# Patient Record
Sex: MALE | Race: ASIAN | NOT HISPANIC OR LATINO
[De-identification: names, ages, dates, MRNs, and addresses within clinical notes are randomized per-mention and may not be internally consistent; named-entity substitution may affect disease eponyms.]

---

## 2022-01-13 PROBLEM — Z00.00 ENCOUNTER FOR PREVENTIVE HEALTH EXAMINATION: Status: ACTIVE | Noted: 2022-01-13

## 2022-01-14 ENCOUNTER — NON-APPOINTMENT (OUTPATIENT)
Age: 62
End: 2022-01-14

## 2022-01-14 ENCOUNTER — APPOINTMENT (OUTPATIENT)
Dept: CARDIOLOGY | Facility: CLINIC | Age: 62
End: 2022-01-14
Payer: MEDICAID

## 2022-01-14 VITALS
WEIGHT: 166 LBS | TEMPERATURE: 97.9 F | RESPIRATION RATE: 18 BRPM | HEART RATE: 63 BPM | SYSTOLIC BLOOD PRESSURE: 165 MMHG | BODY MASS INDEX: 23.77 KG/M2 | OXYGEN SATURATION: 96 % | DIASTOLIC BLOOD PRESSURE: 111 MMHG | HEIGHT: 70.08 IN

## 2022-01-14 DIAGNOSIS — Z86.79 PERSONAL HISTORY OF OTHER DISEASES OF THE CIRCULATORY SYSTEM: ICD-10-CM

## 2022-01-14 DIAGNOSIS — Z80.1 FAMILY HISTORY OF MALIGNANT NEOPLASM OF TRACHEA, BRONCHUS AND LUNG: ICD-10-CM

## 2022-01-14 PROCEDURE — 93000 ELECTROCARDIOGRAM COMPLETE: CPT

## 2022-01-14 PROCEDURE — 99204 OFFICE O/P NEW MOD 45 MIN: CPT

## 2022-01-19 PROBLEM — Z86.79 HISTORY OF CARDIAC ARRHYTHMIA: Status: RESOLVED | Noted: 2022-01-19 | Resolved: 2022-01-19

## 2022-01-19 PROBLEM — Z80.1 FAMILY HISTORY OF LUNG CANCER: Status: ACTIVE | Noted: 2022-01-19

## 2022-01-19 NOTE — HISTORY OF PRESENT ILLNESS
[FreeTextEntry1] : 61 year-old male with HTN and h/o arrhythmia s/p ablation 20 years ago presents for evaluation of CP.  Patient reports that for the past 6 months he has been experiencing left upper CP at night (while sitting), described as sharp pain, not related to exertion, non-tender to touch, not worse with inspiration, lasting 3-4 minutes.  Patient reports SOB at rest.  Patient reports palpitations.  Patient denies h/o syncope.  Patient denies belching.  Patient reports that he last had CXR in 2020.  He is on Amlodipine 10 mg and Losartan 100 mg for HTN.  I advised patient to undergo an echocardiogram.  I will obtain insurance authorization (CP, possible myocarditis/pericarditis).

## 2022-01-28 ENCOUNTER — APPOINTMENT (OUTPATIENT)
Dept: CARDIOLOGY | Facility: CLINIC | Age: 62
End: 2022-01-28
Payer: MEDICAID

## 2022-01-28 VITALS
WEIGHT: 166 LBS | BODY MASS INDEX: 23.77 KG/M2 | OXYGEN SATURATION: 99 % | SYSTOLIC BLOOD PRESSURE: 150 MMHG | TEMPERATURE: 98 F | DIASTOLIC BLOOD PRESSURE: 96 MMHG | RESPIRATION RATE: 18 BRPM | HEART RATE: 82 BPM

## 2022-01-28 PROCEDURE — 93306 TTE W/DOPPLER COMPLETE: CPT

## 2022-01-28 PROCEDURE — 93015 CV STRESS TEST SUPVJ I&R: CPT

## 2022-02-24 NOTE — REASON FOR VISIT
[FreeTextEntry1] : 61 year-old male with HTN and h/o arrhythmia s/p ablation 20 years ago presents for followup.  \par \par Patient was last seen on 1/14/22 for evaluation of CP.  Patient underwent an echocardiogram and it showed normal LV function with mild aortic root dilatation (4.1 cm) and mild dilatation of the ascending aorta (4.1 cm).   Patient underwent a treadmill stress test and completed 10.5 minutes of Trevor protocol.  There were upsloping ST depressions on ECG but no symptoms.  Following treadmill stress, there was no echocardiographic evidence of ischemia.  His BP was elevated.  I advised patient to start HCTZ 12.5 mg.  Dose was increased to 25 mg two weeks later.

## 2022-02-24 NOTE — PHYSICAL EXAM
[Well Developed] : well developed [Well Nourished] : well nourished [No Acute Distress] : no acute distress [Normal Conjunctiva] : normal conjunctiva [Normal Venous Pressure] : normal venous pressure [No Carotid Bruit] : no carotid bruit [Normal S1, S2] : normal S1, S2 [No Murmur] : no murmur [No Rub] : no rub [No Gallop] : no gallop [Clear Lung Fields] : clear lung fields [Good Air Entry] : good air entry [No Respiratory Distress] : no respiratory distress  [Soft] : abdomen soft [Non Tender] : non-tender [No Masses/organomegaly] : no masses/organomegaly [Normal Bowel Sounds] : normal bowel sounds [Normal Gait] : normal gait [No Edema] : no edema [No Cyanosis] : no cyanosis [No Clubbing] : no clubbing [No Varicosities] : no varicosities [Moves all extremities] : moves all extremities [Normal Speech] : normal speech [No Focal Deficits] : no focal deficits [Alert and Oriented] : alert and oriented [Normal memory] : normal memory

## 2022-02-24 NOTE — HISTORY OF PRESENT ILLNESS
[FreeTextEntry1] : \par \par 1/14/22 - Patient reports that for the past 6 months he has been experiencing left upper CP at night (while sitting), described as sharp pain, not related to exertion, non-tender to touch, not worse with inspiration, lasting 3-4 minutes.  Patient reports SOB at rest.  Patient reports palpitations.  Patient denies h/o syncope.  Patient denies belching.  Patient reports that he last had CXR in 2020.  He is on Amlodipine 10 mg and Losartan 100 mg for HTN.  I advised patient to undergo an echocardiogram.  I will obtain insurance authorization (CP, possible myocarditis/pericarditis).

## 2022-02-25 ENCOUNTER — APPOINTMENT (OUTPATIENT)
Dept: CARDIOLOGY | Facility: CLINIC | Age: 62
End: 2022-02-25
Payer: MEDICAID

## 2022-02-25 VITALS
OXYGEN SATURATION: 97 % | SYSTOLIC BLOOD PRESSURE: 148 MMHG | DIASTOLIC BLOOD PRESSURE: 96 MMHG | WEIGHT: 164 LBS | BODY MASS INDEX: 23.48 KG/M2 | TEMPERATURE: 97.6 F | HEART RATE: 87 BPM | RESPIRATION RATE: 18 BRPM

## 2022-02-25 PROCEDURE — 99213 OFFICE O/P EST LOW 20 MIN: CPT

## 2022-03-11 LAB
ALBUMIN SERPL ELPH-MCNC: 4.6 G/DL
ALDOSTERONE SERUM: 24.8 NG/DL
ALP BLD-CCNC: 62 U/L
ALT SERPL-CCNC: 128 U/L
ANION GAP SERPL CALC-SCNC: 14 MMOL/L
AST SERPL-CCNC: 64 U/L
BILIRUB SERPL-MCNC: 0.2 MG/DL
BUN SERPL-MCNC: 17 MG/DL
CALCIUM SERPL-MCNC: 9.1 MG/DL
CHLORIDE SERPL-SCNC: 107 MMOL/L
CO2 SERPL-SCNC: 20 MMOL/L
CREAT SERPL-MCNC: 0.76 MG/DL
GLUCOSE SERPL-MCNC: 104 MG/DL
METANEPHRINE, PL: NORMAL PG/ML
NORMETANEPHRINE, PL: NORMAL PG/ML
POTASSIUM SERPL-SCNC: 4.3 MMOL/L
PROT SERPL-MCNC: 6.8 G/DL
RENIN PLASMA: 6.6 PG/ML
SODIUM SERPL-SCNC: 141 MMOL/L

## 2022-03-17 ENCOUNTER — NON-APPOINTMENT (OUTPATIENT)
Age: 62
End: 2022-03-17

## 2022-03-21 PROBLEM — R07.89 CHEST DISCOMFORT: Status: ACTIVE | Noted: 2022-01-17

## 2022-03-21 NOTE — PHYSICAL EXAM
[Well Nourished] : well nourished [Well Developed] : well developed [No Acute Distress] : no acute distress [Normal Conjunctiva] : normal conjunctiva [Normal Venous Pressure] : normal venous pressure [No Carotid Bruit] : no carotid bruit [Normal S1, S2] : normal S1, S2 [No Murmur] : no murmur [No Rub] : no rub [No Gallop] : no gallop [Clear Lung Fields] : clear lung fields [Good Air Entry] : good air entry [No Respiratory Distress] : no respiratory distress  [Soft] : abdomen soft [Non Tender] : non-tender [No Masses/organomegaly] : no masses/organomegaly [Normal Bowel Sounds] : normal bowel sounds [Normal Gait] : normal gait [No Edema] : no edema [No Cyanosis] : no cyanosis [No Clubbing] : no clubbing [No Varicosities] : no varicosities [Moves all extremities] : moves all extremities [No Focal Deficits] : no focal deficits [Normal Speech] : normal speech [Alert and Oriented] : alert and oriented [Normal memory] : normal memory

## 2022-03-25 ENCOUNTER — APPOINTMENT (OUTPATIENT)
Dept: CARDIOLOGY | Facility: CLINIC | Age: 62
End: 2022-03-25
Payer: MEDICAID

## 2022-03-25 ENCOUNTER — NON-APPOINTMENT (OUTPATIENT)
Age: 62
End: 2022-03-25

## 2022-03-25 VITALS
OXYGEN SATURATION: 97 % | BODY MASS INDEX: 23.62 KG/M2 | TEMPERATURE: 97.8 F | DIASTOLIC BLOOD PRESSURE: 76 MMHG | WEIGHT: 165 LBS | HEART RATE: 91 BPM | RESPIRATION RATE: 18 BRPM | SYSTOLIC BLOOD PRESSURE: 114 MMHG

## 2022-03-25 DIAGNOSIS — R07.89 OTHER CHEST PAIN: ICD-10-CM

## 2022-03-25 DIAGNOSIS — R00.2 PALPITATIONS: ICD-10-CM

## 2022-03-25 PROCEDURE — 99213 OFFICE O/P EST LOW 20 MIN: CPT

## 2022-03-25 PROCEDURE — 93000 ELECTROCARDIOGRAM COMPLETE: CPT

## 2022-03-25 RX ORDER — HYDROCHLOROTHIAZIDE 25 MG/1
25 TABLET ORAL DAILY
Qty: 30 | Refills: 5 | Status: DISCONTINUED | COMMUNITY
Start: 2022-01-14 | End: 2022-03-25

## 2022-03-25 RX ORDER — LOSARTAN POTASSIUM 100 MG/1
100 TABLET, FILM COATED ORAL
Refills: 0 | Status: DISCONTINUED | COMMUNITY
End: 2022-03-25

## 2022-03-25 RX ORDER — AMLODIPINE BESYLATE 10 MG/1
10 TABLET ORAL
Refills: 0 | Status: DISCONTINUED | COMMUNITY
End: 2022-03-25

## 2022-03-25 RX ORDER — ASPIRIN ENTERIC COATED TABLETS 81 MG 81 MG/1
81 TABLET, DELAYED RELEASE ORAL
Refills: 0 | Status: DISCONTINUED | COMMUNITY
End: 2022-03-25

## 2022-04-03 NOTE — HISTORY OF PRESENT ILLNESS
[FreeTextEntry1] : 3/25/22 - Patient reports that his BP is better but HR is still fast. His home BP is 120-130/ 80-94. He also reports chest tightness. He stopped taking Losartan 100 mg and also did not start on HCTZ 25 mg. He is also not taking Aspirin. I advised patient to start on Metoprolol ER 25 mg. Patient did not go for liver US. Fu in 3 months. \par \par 2/25/22 - Patient reports that his home BP is around 140-160/.  Patient reports HA when SBP around 160.  Patient reports two episodes of localized left upper CP lasting seconds.  I advised patient to have a CXR to rule out lung pathology.  His BP was elevated.  I advised patient to switch Amlodipine 10 mg to Nifedipine ER 90 mg.\par \par 1/14/22 - Patient reports that for the past 6 months he has been experiencing left upper CP at night (while sitting), described as sharp pain, not related to exertion, non-tender to touch, not worse with inspiration, lasting 3-4 minutes.  Patient reports SOB at rest.  Patient reports palpitations.  Patient denies h/o syncope.  Patient denies belching.  Patient reports that he last had CXR in 2020.  He is on Amlodipine 10 mg and Losartan 100 mg for HTN.  Patient underwent an echocardiogram and it showed normal LV function with mild aortic root dilatation (4.1 cm) and mild dilatation of the ascending aorta (4.1 cm).  Patient underwent a treadmill stress test and completed 10.5 minutes of Trevor protocol.  There were upsloping ST depressions on ECG but no symptoms.  Following treadmill stress, there was no echocardiographic evidence of ischemia.  His BP was elevated.  I advised patient to start HCTZ 12.5 mg.  Dose was increased to 25 mg two weeks later.

## 2022-04-03 NOTE — REASON FOR VISIT
[FreeTextEntry1] : 61 year-old male with HTN and h/o arrhythmia s/p ablation 20 years ago presents for followup.  \par \par Patient was last seen on 2/25/22 for elevated BP and CP.  I advised patient to have a CXR to rule out lung pathology.  Patient underwent a CXR and it showed no lung pathology.  His BP was elevated.  I advised patient to switch Amlodipine 10 mg to Nifedipine ER 90 mg.  His BT showed abnormal LFT's.  I advised patient to undergo abd sonogram.\par \par He is on Losartan 100 mg, HCTZ 25 mg, Nifedipine ER 90 mg, for HTN.\par \par Patient underwent an echocardiogram 1/28/22 and it showed normal LV function with mild aortic root dilatation (4.1 cm) and mild dilatation of the ascending aorta (4.1 cm).   \par \par Patient underwent a treadmill stress test 1/28/22 and completed 10.5 minutes of Trevor protocol.  There were upsloping ST depressions on ECG but no symptoms.  Following treadmill stress, there was no echocardiographic evidence of ischemia.  His BP was elevated.  I advised patient to start HCTZ 12.5 mg.  Dose was increased to 25 mg two weeks later.

## 2022-06-24 ENCOUNTER — APPOINTMENT (OUTPATIENT)
Dept: CARDIOLOGY | Facility: CLINIC | Age: 62
End: 2022-06-24
Payer: MEDICAID

## 2022-06-24 VITALS
WEIGHT: 161 LBS | BODY MASS INDEX: 23.05 KG/M2 | SYSTOLIC BLOOD PRESSURE: 110 MMHG | TEMPERATURE: 98.5 F | HEART RATE: 82 BPM | OXYGEN SATURATION: 98 % | RESPIRATION RATE: 18 BRPM | DIASTOLIC BLOOD PRESSURE: 73 MMHG

## 2022-06-24 PROCEDURE — 99213 OFFICE O/P EST LOW 20 MIN: CPT

## 2022-06-24 NOTE — REASON FOR VISIT
[FreeTextEntry1] : 61 year-old male with HTN,  aortic aneurysm (4.1 cm),  h/o arrhythmia s/p ablation 20 years ago, presents for followup.  \par \par Patient was last seen on 3/25/22 for elevated BP.   He stopped taking Losartan 100 mg and also did not start HCTZ 25 mg.  I advised patient to start Metoprolol ER 25 mg. \par \par He is on Metoprolol ER 25 mg and Nifedipine ER 90 mg for HTN.\par \par Patient underwent an echocardiogram 1/28/22 and it showed normal LV function with mild aortic root dilatation (4.1 cm) and mild dilatation of the ascending aorta (4.1 cm).   \par \par Patient underwent a treadmill stress test 1/28/22 and completed 10.5 minutes of Trevor protocol.  There were upsloping ST depressions on ECG but no symptoms.  Following treadmill stress, there was no echocardiographic evidence of ischemia.  His BP was elevated.

## 2022-06-24 NOTE — HISTORY OF PRESENT ILLNESS
[FreeTextEntry1] : 6/24/22 - Patient reports that he had liver sonogram done with PCP.  Patient reports two episodes of elevated /120 over the past 3 months.  BP usually 120-130.  Patient reports left posterior HA for the past week, tender to touch.  No rash noted.  Patient reports throat discomfort for the past few days.  Covid negative.  Patient denies CP or SOB.  Patient reports occasional palpitations.  I advised patient to continue current medications.  FU 6 months.  Will need followup echo at the next visit for aortic aneurysm.  \par \par 3/25/22 - Patient reports that his BP is better but HR is still fast. His home BP is 120-130/ 80-94. He also reports chest tightness. He stopped taking Losartan 100 mg and also did not start on HCTZ 25 mg. He is also not taking Aspirin. I advised patient to start on Metoprolol ER 25 mg. Patient did not go for liver US. Fu in 3 months. \par \par 2/25/22 - Patient reports that his home BP is around 140-160/.  Patient reports HA when SBP around 160.  Patient reports two episodes of localized left upper CP lasting seconds.  I advised patient to have a CXR to rule out lung pathology.  His BP was elevated.  I advised patient to switch Amlodipine 10 mg to Nifedipine ER 90 mg.\par \par 1/14/22 - Patient reports that for the past 6 months he has been experiencing left upper CP at night (while sitting), described as sharp pain, not related to exertion, non-tender to touch, not worse with inspiration, lasting 3-4 minutes.  Patient reports SOB at rest.  Patient reports palpitations.  Patient denies h/o syncope.  Patient denies belching.  Patient reports that he last had CXR in 2020.  He is on Amlodipine 10 mg and Losartan 100 mg for HTN.  Patient underwent an echocardiogram and it showed normal LV function with mild aortic root dilatation (4.1 cm) and mild dilatation of the ascending aorta (4.1 cm).  Patient underwent a treadmill stress test and completed 10.5 minutes of Trevor protocol.  There were upsloping ST depressions on ECG but no symptoms.  Following treadmill stress, there was no echocardiographic evidence of ischemia.  His BP was elevated.  I advised patient to start HCTZ 12.5 mg.  Dose was increased to 25 mg two weeks later. yes

## 2023-01-06 ENCOUNTER — APPOINTMENT (OUTPATIENT)
Dept: CARDIOLOGY | Facility: CLINIC | Age: 63
End: 2023-01-06
Payer: MEDICAID

## 2023-01-06 ENCOUNTER — NON-APPOINTMENT (OUTPATIENT)
Age: 63
End: 2023-01-06

## 2023-01-06 VITALS
WEIGHT: 164 LBS | HEART RATE: 92 BPM | OXYGEN SATURATION: 96 % | BODY MASS INDEX: 23.48 KG/M2 | DIASTOLIC BLOOD PRESSURE: 73 MMHG | SYSTOLIC BLOOD PRESSURE: 114 MMHG | TEMPERATURE: 97.7 F | RESPIRATION RATE: 18 BRPM

## 2023-01-06 DIAGNOSIS — G47.00 INSOMNIA, UNSPECIFIED: ICD-10-CM

## 2023-01-06 PROCEDURE — 99214 OFFICE O/P EST MOD 30 MIN: CPT | Mod: 25

## 2023-01-06 PROCEDURE — 93000 ELECTROCARDIOGRAM COMPLETE: CPT

## 2023-01-06 PROCEDURE — 93306 TTE W/DOPPLER COMPLETE: CPT

## 2023-01-06 RX ORDER — GLUCOSAMINE HCL/CHONDROITIN SU 500-400 MG
3 CAPSULE ORAL
Qty: 30 | Refills: 5 | Status: ACTIVE | COMMUNITY
Start: 2023-01-06 | End: 1900-01-01

## 2023-04-06 NOTE — REASON FOR VISIT
[No Acute Distress] : no acute distress [Normal Sclera/Conjunctiva] : normal sclera/conjunctiva [PERRL] : pupils equal round and reactive to light [EOMI] : extraocular movements intact [Normal TMs] : both tympanic membranes were normal [No Lymphadenopathy] : no lymphadenopathy [Supple] : supple [Thyroid Normal, No Nodules] : the thyroid was normal and there were no nodules present [No Respiratory Distress] : no respiratory distress  [No Accessory Muscle Use] : no accessory muscle use [Clear to Auscultation] : lungs were clear to auscultation bilaterally [Normal Rate] : normal rate  [Regular Rhythm] : with a regular rhythm [Normal S1, S2] : normal S1 and S2 [No Murmur] : no murmur heard [No Edema] : there was no peripheral edema [Soft] : abdomen soft [FreeTextEntry1] : 61 year-old male with HTN,  aortic aneurysm (4.1 cm),  h/o arrhythmia s/p ablation 20 years ago, presents for followup.  \par \par Patient was last seen on 6/24/22 for followup.  Patient reported two episodes of elevated /120 over the past 3 months.  BP usually 120-130.  I advised patient to continue current medications.  Echo needed in Jan 2023.\par \par He is on Metoprolol ER 25 mg and Nifedipine ER 90 mg for HTN.\par \par Patient underwent an echocardiogram 1/28/22 and it showed normal LV function with mild aortic root dilatation (4.1 cm) and mild dilatation of the ascending aorta (4.1 cm).   \par \par Patient underwent a treadmill stress test 1/28/22 and completed 10.5 minutes of Trevor protocol.  There were upsloping ST depressions on ECG but no symptoms.  Following treadmill stress, there was no echocardiographic evidence of ischemia.  His BP was elevated.    [Non Tender] : non-tender [Non-distended] : non-distended [Normal Bowel Sounds] : normal bowel sounds [No Hernias] : no hernias [Penis Abnormality] : normal circumcised penis [Scrotum] : the scrotum was normal [Rectal Exam - Seminal Vesicles] : the seminal vesicles were normal [Epididymis] : the epididymides were normal [Testes Tenderness] : no tenderness of the testes [Testes Mass (___cm)] : there were no testicular masses [Normal Posterior Cervical Nodes] : no posterior cervical lymphadenopathy [Normal Anterior Cervical Nodes] : no anterior cervical lymphadenopathy [No Spinal Tenderness] : no spinal tenderness [Grossly Normal Strength/Tone] : grossly normal strength/tone [No Focal Deficits] : no focal deficits [Normal Gait] : normal gait [Deep Tendon Reflexes (DTR)] : deep tendon reflexes were 2+ and symmetric [Normal Affect] : the affect was normal [Normal Insight/Judgement] : insight and judgment were intact

## 2023-04-06 NOTE — HISTORY OF PRESENT ILLNESS
[FreeTextEntry1] : 1/6/23 - Patient reports one episode of palpitations lasting 20 seconds one week ago. Patient reports insomnia. I advised patient to try melatonin. Patient denies CP. Patient denies SOB. I advised patient to undergo an echocardiogram. \par \par 6/24/22 - Patient reports that he had liver sonogram done with PCP.  Patient reports two episodes of elevated /120 over the past 3 months.  BP usually 120-130.  Patient reports left posterior HA for the past week, tender to touch.  No rash noted.  Patient reports throat discomfort for the past few days.  Covid negative.  Patient denies CP or SOB.  Patient reports occasional palpitations.  I advised patient to continue current medications.  FU 6 months.  Will need followup echo at the next visit for aortic aneurysm.  \par \par 3/25/22 - Patient reports that his BP is better but HR is still fast. His home BP is 120-130/ 80-94. He also reports chest tightness. He stopped taking Losartan 100 mg and also did not start on HCTZ 25 mg. He is also not taking Aspirin. I advised patient to start on Metoprolol ER 25 mg. Patient did not go for liver US. Fu in 3 months. \par \par 2/25/22 - Patient reports that his home BP is around 140-160/.  Patient reports HA when SBP around 160.  Patient reports two episodes of localized left upper CP lasting seconds.  I advised patient to have a CXR to rule out lung pathology.  His BP was elevated.  I advised patient to switch Amlodipine 10 mg to Nifedipine ER 90 mg.\par \par 1/14/22 - Patient reports that for the past 6 months he has been experiencing left upper CP at night (while sitting), described as sharp pain, not related to exertion, non-tender to touch, not worse with inspiration, lasting 3-4 minutes.  Patient reports SOB at rest.  Patient reports palpitations.  Patient denies h/o syncope.  Patient denies belching.  Patient reports that he last had CXR in 2020.  He is on Amlodipine 10 mg and Losartan 100 mg for HTN.  Patient underwent an echocardiogram and it showed normal LV function with mild aortic root dilatation (4.1 cm) and mild dilatation of the ascending aorta (4.1 cm).  Patient underwent a treadmill stress test and completed 10.5 minutes of Trevor protocol.  There were upsloping ST depressions on ECG but no symptoms.  Following treadmill stress, there was no echocardiographic evidence of ischemia.  His BP was elevated.  I advised patient to start HCTZ 12.5 mg.  Dose was increased to 25 mg two weeks later.

## 2023-06-23 ENCOUNTER — APPOINTMENT (OUTPATIENT)
Dept: UROLOGY | Facility: CLINIC | Age: 63
End: 2023-06-23

## 2023-06-30 ENCOUNTER — APPOINTMENT (OUTPATIENT)
Dept: CARDIOLOGY | Facility: CLINIC | Age: 63
End: 2023-06-30
Payer: MEDICAID

## 2023-06-30 ENCOUNTER — NON-APPOINTMENT (OUTPATIENT)
Age: 63
End: 2023-06-30

## 2023-06-30 VITALS
RESPIRATION RATE: 18 BRPM | HEART RATE: 77 BPM | SYSTOLIC BLOOD PRESSURE: 130 MMHG | TEMPERATURE: 97.8 F | WEIGHT: 160 LBS | OXYGEN SATURATION: 97 % | BODY MASS INDEX: 22.91 KG/M2 | DIASTOLIC BLOOD PRESSURE: 90 MMHG

## 2023-06-30 PROCEDURE — 99214 OFFICE O/P EST MOD 30 MIN: CPT | Mod: 25

## 2023-06-30 PROCEDURE — 93000 ELECTROCARDIOGRAM COMPLETE: CPT

## 2023-06-30 RX ORDER — LOSARTAN POTASSIUM 50 MG/1
50 TABLET, FILM COATED ORAL DAILY
Qty: 30 | Refills: 5 | Status: ACTIVE | COMMUNITY
Start: 2023-06-30 | End: 1900-01-01

## 2023-06-30 NOTE — HISTORY OF PRESENT ILLNESS
[FreeTextEntry1] : 6/30/23 - Pt reports labile BP recently. His BP usually 100-150s/-. He is on Nifedipine ER 90 mg in am and Metoprolol ER 25 mg in pm. He occasionally takes 1-1/2 - 2 tabs of Nifedipine 90 mg when his BP is 150s. About 10 days ago, he has an episode of dizziness and chest discomfort with BP 80s/60 and HR 90s.  At baseline, pt denies CP or SOB with exertion.  Patient denies palpitations.  ECG showed no ischemic changes.  Patient was not orthostatic (-135-130).  Will recheck plasma metanephrine level (not performed last year due to shortage).  I advised patient to start Losartan 50 mg (patient has not been taking Losartan 100 mg).  FU 3 months. \par \par 1/6/23 - Patient reports one episode of palpitations lasting 20 seconds one week ago. Patient reports insomnia. I advised patient to try melatonin. Patient denies CP. Patient denies SOB. I advised patient to undergo an echocardiogram.  Patient underwent an echocardiogram and it showed normal LV function, mild aortic root dilatation (4.2 cm), mild dilatation of the ascending aorta (4.0 cm), without significant valvular pathology. \par \par 6/24/22 - Patient reports that he had liver sonogram done with PCP.  Patient reports two episodes of elevated /120 over the past 3 months.  BP usually 120-130.  Patient reports left posterior HA for the past week, tender to touch.  No rash noted.  Patient reports throat discomfort for the past few days.  Covid negative.  Patient denies CP or SOB.  Patient reports occasional palpitations.  I advised patient to continue current medications.  FU 6 months.  Will need followup echo at the next visit for aortic aneurysm.  \par \par 3/25/22 - Patient reports that his BP is better but HR is still fast. His home BP is 120-130/ 80-94. He also reports chest tightness. He stopped taking Losartan 100 mg and also did not start on HCTZ 25 mg. He is also not taking Aspirin. I advised patient to start on Metoprolol ER 25 mg. Patient did not go for liver US. Fu in 3 months. \par \par 2/25/22 - Patient reports that his home BP is around 140-160/.  Patient reports HA when SBP around 160.  Patient reports two episodes of localized left upper CP lasting seconds.  I advised patient to have a CXR to rule out lung pathology.  His BP was elevated.  I advised patient to switch Amlodipine 10 mg to Nifedipine ER 90 mg.\par \par 1/14/22 - Patient reports that for the past 6 months he has been experiencing left upper CP at night (while sitting), described as sharp pain, not related to exertion, non-tender to touch, not worse with inspiration, lasting 3-4 minutes.  Patient reports SOB at rest.  Patient reports palpitations.  Patient denies h/o syncope.  Patient denies belching.  Patient reports that he last had CXR in 2020.  He is on Amlodipine 10 mg and Losartan 100 mg for HTN.  Patient underwent an echocardiogram and it showed normal LV function with mild aortic root dilatation (4.1 cm) and mild dilatation of the ascending aorta (4.1 cm).  Patient underwent a treadmill stress test and completed 10.5 minutes of Trevor protocol.  There were upsloping ST depressions on ECG but no symptoms.  Following treadmill stress, there was no echocardiographic evidence of ischemia.  His BP was elevated.  I advised patient to start HCTZ 12.5 mg.  Dose was increased to 25 mg two weeks later.

## 2023-06-30 NOTE — REASON FOR VISIT
[FreeTextEntry1] : 61 year-old male with HTN,  aortic aneurysm (4.1 cm),  h/o arrhythmia s/p ablation 20 years ago, presents for followup.  \par \par Patient was last seen on 1/6/23 - Patient reports one episode of palpitations lasting 20 seconds one week ago. Patient reports insomnia. I advised patient to try melatonin. Patient denies CP. Patient denies SOB. I advised patient to undergo an echocardiogram.  Patient underwent an echocardiogram and it showed normal LV function, mild aortic root dilatation (4.2 cm), mild dilatation of the ascending aorta (4.0 cm), without significant valvular pathology. \par \par He is on Metoprolol ER 25 mg and Nifedipine ER 90 mg for HTN.\par \par Patient underwent an echocardiogram 1/28/22 and it showed normal LV function with mild aortic root dilatation (4.1 cm) and mild dilatation of the ascending aorta (4.1 cm).   \par \par Patient underwent a treadmill stress test 1/28/22 and completed 10.5 minutes of Trevor protocol.  There were upsloping ST depressions on ECG but no symptoms.  Following treadmill stress, there was no echocardiographic evidence of ischemia.  His BP was elevated.

## 2023-07-07 LAB
ALBUMIN SERPL ELPH-MCNC: 4.5 G/DL
ALP BLD-CCNC: 80 U/L
ALT SERPL-CCNC: 66 U/L
ANION GAP SERPL CALC-SCNC: 14 MMOL/L
AST SERPL-CCNC: 39 U/L
BILIRUB SERPL-MCNC: 0.5 MG/DL
BUN SERPL-MCNC: 16 MG/DL
CALCIUM SERPL-MCNC: 9.3 MG/DL
CHLORIDE SERPL-SCNC: 106 MMOL/L
CO2 SERPL-SCNC: 24 MMOL/L
CREAT SERPL-MCNC: 0.89 MG/DL
EGFR: 98 ML/MIN/1.73M2
GLUCOSE SERPL-MCNC: 172 MG/DL
METANEPHRINE, PL: 21.5 PG/ML
NORMETANEPHRINE, PL: 67.8 PG/ML
POTASSIUM SERPL-SCNC: 3.8 MMOL/L
PROT SERPL-MCNC: 6.9 G/DL
SODIUM SERPL-SCNC: 143 MMOL/L

## 2023-09-24 PROBLEM — I10 HTN (HYPERTENSION): Status: ACTIVE | Noted: 2022-01-14

## 2023-09-24 PROBLEM — I71.9 AORTIC ANEURYSM: Status: ACTIVE | Noted: 2022-06-24

## 2023-09-29 ENCOUNTER — APPOINTMENT (OUTPATIENT)
Dept: CARDIOLOGY | Facility: CLINIC | Age: 63
End: 2023-09-29
Payer: MEDICAID

## 2023-09-29 VITALS
WEIGHT: 151 LBS | RESPIRATION RATE: 18 BRPM | OXYGEN SATURATION: 98 % | SYSTOLIC BLOOD PRESSURE: 112 MMHG | DIASTOLIC BLOOD PRESSURE: 75 MMHG | HEART RATE: 77 BPM | BODY MASS INDEX: 21.62 KG/M2 | TEMPERATURE: 97.6 F

## 2023-09-29 DIAGNOSIS — I10 ESSENTIAL (PRIMARY) HYPERTENSION: ICD-10-CM

## 2023-09-29 DIAGNOSIS — I71.9 AORTIC ANEURYSM OF UNSPECIFIED SITE, W/OUT RUPTURE: ICD-10-CM

## 2023-09-29 PROCEDURE — 99213 OFFICE O/P EST LOW 20 MIN: CPT

## 2023-09-29 RX ORDER — METOPROLOL SUCCINATE 25 MG/1
25 TABLET, EXTENDED RELEASE ORAL DAILY
Qty: 90 | Refills: 1 | Status: ACTIVE | COMMUNITY
Start: 2022-03-25 | End: 1900-01-01

## 2023-09-29 RX ORDER — NIFEDIPINE 90 MG/1
90 TABLET, EXTENDED RELEASE ORAL DAILY
Qty: 90 | Refills: 1 | Status: ACTIVE | COMMUNITY
Start: 2022-02-25 | End: 1900-01-01

## 2024-05-31 ENCOUNTER — APPOINTMENT (OUTPATIENT)
Dept: CARDIOLOGY | Facility: CLINIC | Age: 64
End: 2024-05-31